# Patient Record
Sex: FEMALE | Race: WHITE | URBAN - METROPOLITAN AREA
[De-identification: names, ages, dates, MRNs, and addresses within clinical notes are randomized per-mention and may not be internally consistent; named-entity substitution may affect disease eponyms.]

---

## 2019-02-04 ENCOUNTER — Encounter (OUTPATIENT)
Dept: URBAN - METROPOLITAN AREA CLINIC 71 | Facility: CLINIC | Age: 79
End: 2019-02-04
Payer: COMMERCIAL

## 2019-02-04 PROCEDURE — 92004 COMPRE OPH EXAM NEW PT 1/>: CPT | Performed by: OPHTHALMOLOGY

## 2019-02-04 PROCEDURE — 92014 COMPRE OPH EXAM EST PT 1/>: CPT | Performed by: OPHTHALMOLOGY

## 2019-02-04 PROCEDURE — 92134 CPTRZ OPH DX IMG PST SGM RTA: CPT | Performed by: OPHTHALMOLOGY

## 2019-02-04 PROCEDURE — 90780: CPT | Performed by: OPHTHALMOLOGY

## 2020-02-12 ENCOUNTER — OFFICE VISIT (OUTPATIENT)
Dept: URBAN - METROPOLITAN AREA CLINIC 71 | Facility: CLINIC | Age: 80
End: 2020-02-12
Payer: COMMERCIAL

## 2020-02-12 DIAGNOSIS — H43.813 VITREOUS DEGENERATION, BILATERAL: Primary | ICD-10-CM

## 2020-02-12 DIAGNOSIS — H04.123 DRY EYE SYNDROME OF BILATERAL LACRIMAL GLANDS: ICD-10-CM

## 2020-02-12 PROCEDURE — 92014 COMPRE OPH EXAM EST PT 1/>: CPT | Performed by: OPHTHALMOLOGY

## 2020-02-12 PROCEDURE — 92134 CPTRZ OPH DX IMG PST SGM RTA: CPT | Performed by: OPHTHALMOLOGY

## 2020-02-12 ASSESSMENT — INTRAOCULAR PRESSURE
OS: 14
OD: 16

## 2020-02-12 ASSESSMENT — KERATOMETRY
OS: 46.00
OD: 46.00

## 2020-02-12 NOTE — IMPRESSION/PLAN
Impression: Vitreous degeneration, bilateral: H43.813. Plan: Posterior vitreous detachments (PVD) usually diminish with time, but it may take several months. They rarely disappear entirely. PVD is a normal aging change due to vitreous jelly pulling away from the retinal lining of the eye. They may accompany retinal tears, retinal holes, or retinal detachments, so a dilated retinal exam is important. Contact office if symptoms worsen, including increased floaters, flashing light, loss of vision or a black curtain blocking your field of vision.

## 2020-02-12 NOTE — IMPRESSION/PLAN
Impression: Dry eye syndrome of bilateral lacrimal glands: H04.123. Plan: Dry eye syndrome requires lubrication of the eye with artificial tears and nighttime ointments or gels. In addition, topical and oral anti-inflammatory medications are usually necessary to treat the associated ocular irritation. Contact office if dry eye does not improve, worsens or blurs vision. Recommend 3-4 drops daily of Systane Balance and ointment at bedtime.

## 2021-02-10 ENCOUNTER — OFFICE VISIT (OUTPATIENT)
Dept: URBAN - METROPOLITAN AREA CLINIC 71 | Facility: CLINIC | Age: 81
End: 2021-02-10
Payer: COMMERCIAL

## 2021-02-10 PROCEDURE — 92014 COMPRE OPH EXAM EST PT 1/>: CPT | Performed by: OPHTHALMOLOGY

## 2021-02-10 PROCEDURE — 92134 CPTRZ OPH DX IMG PST SGM RTA: CPT | Performed by: OPHTHALMOLOGY

## 2021-02-10 ASSESSMENT — INTRAOCULAR PRESSURE
OD: 15
OS: 14

## 2021-02-10 NOTE — IMPRESSION/PLAN
Impression: Dry eye syndrome of bilateral lacrimal glands: H04.123. Plan: Discussed with pt that using the OTC tears 3-4 times daily- recommended Pataday for the itchiness. Warm compress for 10-15 mins along with lid scrubs to clean them.

## 2021-02-10 NOTE — IMPRESSION/PLAN
Impression: Drusen (degenerative) of macula, bilateral: H35.363. OCT ordered today. Plan: Stable- no progression. Recommended to take an eye vitamin. 
Will continue to monitor

## 2022-02-10 ENCOUNTER — OFFICE VISIT (OUTPATIENT)
Dept: URBAN - METROPOLITAN AREA CLINIC 71 | Facility: CLINIC | Age: 82
End: 2022-02-10
Payer: COMMERCIAL

## 2022-02-10 DIAGNOSIS — Z96.1 PRESENCE OF INTRAOCULAR LENS: ICD-10-CM

## 2022-02-10 DIAGNOSIS — H35.363 DRUSEN (DEGENERATIVE) OF MACULA, BILATERAL: Primary | ICD-10-CM

## 2022-02-10 DIAGNOSIS — H26.493 OTHER SECONDARY CATARACT, BILATERAL: ICD-10-CM

## 2022-02-10 PROCEDURE — 92014 COMPRE OPH EXAM EST PT 1/>: CPT | Performed by: OPHTHALMOLOGY

## 2022-02-10 ASSESSMENT — INTRAOCULAR PRESSURE
OD: 14
OS: 16

## 2022-02-10 NOTE — IMPRESSION/PLAN
Impression: Dry eye syndrome of bilateral lacrimal glands: H04.123. Plan: Recommend artificial tears as needed.

## 2022-02-10 NOTE — IMPRESSION/PLAN
Impression: Other secondary cataract, bilateral: H26.493. Plan: slight haze, if it starts to bother vision pt to call us.

## 2022-02-10 NOTE — IMPRESSION/PLAN
Impression: Drusen (degenerative) of macula, bilateral: H35.363. Plan: Stable, no progression seen since last years visit. Will continue to monitor yearly, with DFE OCT. Recommend taking an eye vitamins to help slow down the progression. significant other

## 2022-10-05 ENCOUNTER — OFFICE VISIT (OUTPATIENT)
Dept: URBAN - METROPOLITAN AREA CLINIC 71 | Facility: CLINIC | Age: 82
End: 2022-10-05
Payer: MEDICARE

## 2022-10-05 DIAGNOSIS — H04.123 DRY EYE SYNDROME OF BILATERAL LACRIMAL GLANDS: Primary | ICD-10-CM

## 2022-10-05 PROCEDURE — 92012 INTRM OPH EXAM EST PATIENT: CPT | Performed by: PHYSICIAN ASSISTANT

## 2022-10-05 ASSESSMENT — INTRAOCULAR PRESSURE
OD: 12
OS: 12

## 2022-10-05 NOTE — IMPRESSION/PLAN
Impression: Dry eye syndrome of bilateral lacrimal glands: H04.123. Plan: No foreign bodies other than some makeup on lids and in tear film. Dry eyes account for the patient's complaints. There is no evidence of permanent changes to the cornea. Explained condition does not have a cure and will need routine treatment. Treatment:
Artificial tears to use 1-4 times daily Warm compresses BID up to 5 times daily if needed

## 2022-11-22 ENCOUNTER — OFFICE VISIT (OUTPATIENT)
Dept: URBAN - METROPOLITAN AREA CLINIC 71 | Facility: CLINIC | Age: 82
End: 2022-11-22
Payer: MEDICARE

## 2022-11-22 DIAGNOSIS — H04.123 DRY EYE SYNDROME OF BILATERAL LACRIMAL GLANDS: ICD-10-CM

## 2022-11-22 DIAGNOSIS — H35.363 DRUSEN (DEGENERATIVE) OF MACULA, BILATERAL: ICD-10-CM

## 2022-11-22 DIAGNOSIS — H43.813 VITREOUS DEGENERATION, BILATERAL: ICD-10-CM

## 2022-11-22 DIAGNOSIS — Z96.1 PRESENCE OF INTRAOCULAR LENS: ICD-10-CM

## 2022-11-22 DIAGNOSIS — H01.115 ALLERGIC DERMATITIS OF LEFT LOWER EYELID: ICD-10-CM

## 2022-11-22 DIAGNOSIS — H01.112 ALLERGIC DERMATITIS OF RIGHT LOWER EYELID: Primary | ICD-10-CM

## 2022-11-22 PROCEDURE — 99213 OFFICE O/P EST LOW 20 MIN: CPT

## 2022-11-22 RX ORDER — NEOMYCIN, POLYMYXIN B SULFATES, DEXAMETHASONE 1; 3.5; 1 MG/G; MG/G; [USP'U]/G
OINTMENT OPHTHALMIC
Qty: 3.5 | Refills: 0 | Status: ACTIVE
Start: 2022-11-22

## 2022-11-22 ASSESSMENT — INTRAOCULAR PRESSURE
OS: 16
OD: 14

## 2022-11-22 NOTE — IMPRESSION/PLAN
Impression: Allergic dermatitis of right lower eyelid: H01.112. Plan: Discussed in detail with patient. Begin shlomo-poly-dex damion BID on the lower and upper eyelids OU for 7 to 10 days. Pt advised to use a bit of vaseline or similar ointment to use throughout the day to help with dryness. Pt advised it will likely take about 3 days before noticing any improvement. Advised oral antihistamine may help improve symptoms more quickly. Contact office if no improvement or if symptoms worsen.

## 2022-12-19 ENCOUNTER — OFFICE VISIT (OUTPATIENT)
Dept: URBAN - METROPOLITAN AREA CLINIC 71 | Facility: CLINIC | Age: 82
End: 2022-12-19
Payer: MEDICARE

## 2022-12-19 DIAGNOSIS — H01.115 ALLERGIC DERMATITIS OF LEFT LOWER EYELID: ICD-10-CM

## 2022-12-19 DIAGNOSIS — H10.45 OTHER CHRONIC ALLERGIC CONJUNCTIVITIS: Primary | ICD-10-CM

## 2022-12-19 DIAGNOSIS — Z96.1 PRESENCE OF INTRAOCULAR LENS: ICD-10-CM

## 2022-12-19 DIAGNOSIS — H01.112 ALLERGIC DERMATITIS OF RIGHT LOWER EYELID: ICD-10-CM

## 2022-12-19 PROCEDURE — 92012 INTRM OPH EXAM EST PATIENT: CPT | Performed by: PHYSICIAN ASSISTANT

## 2022-12-19 RX ORDER — OLOPATADINE HYDROCHLORIDE OPHTHALMIC 2 MG/ML
0.2 % SOLUTION OPHTHALMIC
Qty: 1 | Refills: 4 | Status: ACTIVE
Start: 2022-12-19

## 2022-12-19 ASSESSMENT — INTRAOCULAR PRESSURE
OS: 16
OD: 16

## 2022-12-19 NOTE — IMPRESSION/PLAN
Impression: Presence of intraocular lens: Z96.1. 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 IOL well-positioned. Patient to call office with any problems. Answered all questions and addressed all concerns.

## 2022-12-19 NOTE — IMPRESSION/PLAN
Impression: Other chronic allergic conjunctivitis: H10.45. Plan: Patient's symptoms are due to allergic conjunctivitis. Discussed etiology and pathophysiology. Will begin allergy drops and/or cool compresses. Patient to call if notices worsening symptoms. Answered all questions and addressed all concerns. Begin cool compresses Begin olopatadine May continue PFATs in fridge but recommended using as a little as possible

## 2022-12-19 NOTE — IMPRESSION/PLAN
Impression: Allergic dermatitis of left lower eyelid: H01.115. Plan: Can continue/restart maxitrol ointment -- discussed that this may be used safely for 1-2 weeks. Can use BID to QID. Begin cool compresses. Discussed that need to eliminate source of reaction; recommend switching to hypoallergic products (eg makeup, soap, laundry detergent). Answered all questions.

## 2022-12-19 NOTE — IMPRESSION/PLAN
Impression: Allergic dermatitis of right lower eyelid: H01.112. Plan: Can continue/restart maxitrol ointment -- discussed that this may be used safely for 1-2 weeks. Can use BID to QID. Begin cool compresses. Discussed that need to eliminate source of reaction; recommend switching to hypoallergic products (eg makeup, soap, laundry detergent). Answered all questions.

## 2023-01-06 ENCOUNTER — OFFICE VISIT (OUTPATIENT)
Dept: URBAN - METROPOLITAN AREA CLINIC 71 | Facility: CLINIC | Age: 83
End: 2023-01-06
Payer: MEDICARE

## 2023-01-06 DIAGNOSIS — Z96.1 PRESENCE OF INTRAOCULAR LENS: ICD-10-CM

## 2023-01-06 DIAGNOSIS — H01.112 ALLERGIC DERMATITIS OF RIGHT LOWER EYELID: Primary | ICD-10-CM

## 2023-01-06 DIAGNOSIS — H04.123 DRY EYE SYNDROME OF BILATERAL LACRIMAL GLANDS: ICD-10-CM

## 2023-01-06 DIAGNOSIS — H26.493 OTHER SECONDARY CATARACT, BILATERAL: ICD-10-CM

## 2023-01-06 DIAGNOSIS — H01.115 ALLERGIC DERMATITIS OF LEFT LOWER EYELID: ICD-10-CM

## 2023-01-06 PROCEDURE — 92012 INTRM OPH EXAM EST PATIENT: CPT | Performed by: PHYSICIAN ASSISTANT

## 2023-01-06 ASSESSMENT — INTRAOCULAR PRESSURE
OS: 11
OD: 14

## 2023-01-06 NOTE — IMPRESSION/PLAN
Impression: Allergic dermatitis of right lower eyelid: H01.112. Plan: Resolved. Discussed that may begin reintroducing PFATs, shampoo, and moisturizer one at a time if she wishes but to wait several weeks before reintroducing another product. Continue no maxitrol. May restart ointment if needed but call clinic if uses as this may increase IOP. F/u as scheduled.

## 2023-01-06 NOTE — IMPRESSION/PLAN
Impression: Dry eye syndrome of bilateral lacrimal glands: H04.123. Plan: Continue warm compresses. May begin reintroducing PFATs PRN if desires.

## 2023-01-06 NOTE — IMPRESSION/PLAN
Impression: Allergic dermatitis of left lower eyelid: H01.115. Plan: Resolved. Discussed that may begin reintroducing PFATs, shampoo, and moisturizer one at a time if she wishes but to wait several weeks before reintroducing another product. Continue no maxitrol. May restart ointment if needed but call clinic if uses as this may increase IOP. F/u as scheduled.

## 2023-01-06 NOTE — IMPRESSION/PLAN
Impression: Other secondary cataract, bilateral: H26.493. Plan: Discussed that patient has PCO and that this may impact vision. Discussed etiology and treatment options. Will refer to surgeon for YAG capsulotomy when  patient is ready. Answered all questions and addressed all concerns.

## 2023-02-15 ENCOUNTER — OFFICE VISIT (OUTPATIENT)
Dept: URBAN - METROPOLITAN AREA CLINIC 71 | Facility: CLINIC | Age: 83
End: 2023-02-15
Payer: MEDICARE

## 2023-02-15 DIAGNOSIS — H04.123 DRY EYE SYNDROME OF BILATERAL LACRIMAL GLANDS: ICD-10-CM

## 2023-02-15 DIAGNOSIS — H35.363 DRUSEN (DEGENERATIVE) OF MACULA, BILATERAL: Primary | ICD-10-CM

## 2023-02-15 DIAGNOSIS — Z96.1 PRESENCE OF INTRAOCULAR LENS: ICD-10-CM

## 2023-02-15 DIAGNOSIS — H26.493 OTHER SECONDARY CATARACT, BILATERAL: ICD-10-CM

## 2023-02-15 DIAGNOSIS — H01.113 ALLERGIC DERMATITIS OF RIGHT EYE, UNSPECIFIED EYELID: ICD-10-CM

## 2023-02-15 PROCEDURE — 92134 CPTRZ OPH DX IMG PST SGM RTA: CPT | Performed by: OPHTHALMOLOGY

## 2023-02-15 PROCEDURE — 99213 OFFICE O/P EST LOW 20 MIN: CPT | Performed by: OPHTHALMOLOGY

## 2023-02-15 RX ORDER — FLUOROMETHOLONE 1 MG/ML
0.1 % SOLUTION/ DROPS OPHTHALMIC
Qty: 5 | Refills: 0 | Status: ACTIVE
Start: 2023-02-15

## 2023-02-15 ASSESSMENT — INTRAOCULAR PRESSURE
OS: 9
OD: 13

## 2024-03-27 ENCOUNTER — OFFICE VISIT (OUTPATIENT)
Dept: URBAN - METROPOLITAN AREA CLINIC 71 | Facility: CLINIC | Age: 84
End: 2024-03-27
Payer: MEDICARE

## 2024-03-27 DIAGNOSIS — H04.123 DRY EYE SYNDROME OF BILATERAL LACRIMAL GLANDS: ICD-10-CM

## 2024-03-27 DIAGNOSIS — Z96.1 PRESENCE OF INTRAOCULAR LENS: ICD-10-CM

## 2024-03-27 DIAGNOSIS — H35.363 DRUSEN (DEGENERATIVE) OF MACULA, BILATERAL: Primary | ICD-10-CM

## 2024-03-27 PROCEDURE — 99213 OFFICE O/P EST LOW 20 MIN: CPT | Performed by: OPHTHALMOLOGY

## 2024-03-27 PROCEDURE — 92133 CPTRZD OPH DX IMG PST SGM ON: CPT | Performed by: OPHTHALMOLOGY

## 2024-03-27 PROCEDURE — 92134 CPTRZ OPH DX IMG PST SGM RTA: CPT | Performed by: OPHTHALMOLOGY

## 2024-03-27 RX ORDER — FLUOROMETHOLONE 1 MG/ML
0.1 % SOLUTION/ DROPS OPHTHALMIC
Qty: 5 | Refills: 0 | Status: ACTIVE
Start: 2024-03-27

## 2024-03-27 ASSESSMENT — INTRAOCULAR PRESSURE
OD: 16
OS: 15

## 2024-05-29 ENCOUNTER — OFFICE VISIT (OUTPATIENT)
Dept: URBAN - METROPOLITAN AREA CLINIC 71 | Facility: CLINIC | Age: 84
End: 2024-05-29
Payer: MEDICARE

## 2024-05-29 DIAGNOSIS — H04.123 DRY EYE SYNDROME OF BILATERAL LACRIMAL GLANDS: Primary | ICD-10-CM

## 2024-05-29 PROCEDURE — 99213 OFFICE O/P EST LOW 20 MIN: CPT | Performed by: OPHTHALMOLOGY

## 2024-05-29 ASSESSMENT — INTRAOCULAR PRESSURE
OD: 14
OS: 16

## 2024-06-09 NOTE — IMPRESSION/PLAN
Impression: Vitreous degeneration, bilateral: H43.813. Plan: Discussed s/s of a retinal detachment. 
pt to call if any sudden vision changes pulse oximetry

## 2024-10-30 ENCOUNTER — OFFICE VISIT (OUTPATIENT)
Dept: URBAN - METROPOLITAN AREA CLINIC 71 | Facility: CLINIC | Age: 84
End: 2024-10-30
Payer: MEDICARE

## 2024-10-30 DIAGNOSIS — H26.493 OTHER SECONDARY CATARACT, BILATERAL: Primary | ICD-10-CM

## 2024-10-30 DIAGNOSIS — Z96.1 PRESENCE OF INTRAOCULAR LENS: ICD-10-CM

## 2024-10-30 DIAGNOSIS — H35.363 DRUSEN (DEGENERATIVE) OF MACULA, BILATERAL: ICD-10-CM

## 2024-10-30 DIAGNOSIS — H04.123 DRY EYE SYNDROME OF BILATERAL LACRIMAL GLANDS: ICD-10-CM

## 2024-10-30 PROCEDURE — 99214 OFFICE O/P EST MOD 30 MIN: CPT | Performed by: OPHTHALMOLOGY

## 2024-10-30 RX ORDER — CYCLOSPORINE 0.5 MG/ML
0.05 % EMULSION OPHTHALMIC
Qty: 180 | Refills: 2 | Status: ACTIVE
Start: 2024-10-30

## 2024-10-30 ASSESSMENT — INTRAOCULAR PRESSURE
OD: 16
OS: 17

## 2024-10-30 ASSESSMENT — VISUAL ACUITY
OD: 20/20
OS: 20/20

## 2024-12-10 ENCOUNTER — SURGERY (OUTPATIENT)
Dept: URBAN - METROPOLITAN AREA SURGERY 44 | Facility: SURGERY | Age: 84
End: 2024-12-10
Payer: MEDICARE

## 2024-12-10 ENCOUNTER — SURGERY (OUTPATIENT)
Dept: URBAN - METROPOLITAN AREA SURGERY 45 | Facility: SURGERY | Age: 84
End: 2024-12-10
Payer: MEDICARE

## 2024-12-10 PROCEDURE — 66821 AFTER CATARACT LASER SURGERY: CPT | Performed by: OPHTHALMOLOGY

## 2024-12-20 ENCOUNTER — POST-OPERATIVE VISIT (OUTPATIENT)
Dept: URBAN - METROPOLITAN AREA CLINIC 72 | Facility: CLINIC | Age: 84
End: 2024-12-20
Payer: MEDICARE

## 2024-12-20 DIAGNOSIS — Z48.810 ENCOUNTER FOR SURGICAL AFTERCARE FOLLOWING SURGERY ON A SENSE ORGAN: Primary | ICD-10-CM

## 2024-12-20 PROCEDURE — 99024 POSTOP FOLLOW-UP VISIT: CPT | Performed by: OPTOMETRIST

## 2024-12-20 ASSESSMENT — INTRAOCULAR PRESSURE
OD: 17
OS: 16

## 2025-03-24 ENCOUNTER — OFFICE VISIT (OUTPATIENT)
Dept: URBAN - METROPOLITAN AREA CLINIC 71 | Facility: CLINIC | Age: 85
End: 2025-03-24
Payer: MEDICARE

## 2025-03-24 DIAGNOSIS — H11.32 SUBCONJUNCTIVAL HEMORRHAGE OF LEFT EYE: ICD-10-CM

## 2025-03-24 DIAGNOSIS — H04.123 DRY EYE SYNDROME OF BILATERAL LACRIMAL GLANDS: ICD-10-CM

## 2025-03-24 DIAGNOSIS — H35.363 DRUSEN (DEGENERATIVE) OF MACULA, BILATERAL: Primary | ICD-10-CM

## 2025-03-24 DIAGNOSIS — H43.813 VITREOUS DEGENERATION, BILATERAL: ICD-10-CM

## 2025-03-24 DIAGNOSIS — Z96.1 PRESENCE OF INTRAOCULAR LENS: ICD-10-CM

## 2025-03-24 PROCEDURE — 99213 OFFICE O/P EST LOW 20 MIN: CPT | Performed by: OPHTHALMOLOGY

## 2025-03-24 PROCEDURE — 92134 CPTRZ OPH DX IMG PST SGM RTA: CPT | Performed by: OPHTHALMOLOGY

## 2025-03-24 PROCEDURE — 92133 CPTRZD OPH DX IMG PST SGM ON: CPT | Performed by: OPHTHALMOLOGY

## 2025-03-24 ASSESSMENT — INTRAOCULAR PRESSURE
OS: 17
OD: 18

## 2025-08-25 ENCOUNTER — OFFICE VISIT (OUTPATIENT)
Dept: URBAN - METROPOLITAN AREA CLINIC 71 | Facility: CLINIC | Age: 85
End: 2025-08-25
Payer: MEDICARE

## 2025-08-25 DIAGNOSIS — Z96.1 PRESENCE OF INTRAOCULAR LENS: ICD-10-CM

## 2025-08-25 DIAGNOSIS — H04.123 DRY EYE SYNDROME OF BILATERAL LACRIMAL GLANDS: Primary | ICD-10-CM

## 2025-08-25 PROCEDURE — 99213 OFFICE O/P EST LOW 20 MIN: CPT | Performed by: OPHTHALMOLOGY

## 2025-08-25 RX ORDER — FLUOROMETHOLONE 1 MG/ML
0.1 % SUSPENSION/ DROPS OPHTHALMIC
Qty: 5 | Refills: 5 | Status: ACTIVE
Start: 2025-08-25

## 2025-08-25 ASSESSMENT — INTRAOCULAR PRESSURE
OD: 14
OS: 12